# Patient Record
Sex: MALE | Race: WHITE | NOT HISPANIC OR LATINO
[De-identification: names, ages, dates, MRNs, and addresses within clinical notes are randomized per-mention and may not be internally consistent; named-entity substitution may affect disease eponyms.]

---

## 2017-02-05 ENCOUNTER — TRANSCRIPTION ENCOUNTER (OUTPATIENT)
Age: 62
End: 2017-02-05

## 2020-08-13 ENCOUNTER — APPOINTMENT (OUTPATIENT)
Dept: UROLOGY | Facility: CLINIC | Age: 65
End: 2020-08-13
Payer: COMMERCIAL

## 2020-08-13 VITALS
RESPIRATION RATE: 16 BRPM | HEART RATE: 66 BPM | BODY MASS INDEX: 29.49 KG/M2 | HEIGHT: 70 IN | WEIGHT: 206 LBS | SYSTOLIC BLOOD PRESSURE: 135 MMHG | DIASTOLIC BLOOD PRESSURE: 80 MMHG

## 2020-08-13 VITALS — TEMPERATURE: 97.1 F

## 2020-08-13 DIAGNOSIS — Z80.42 FAMILY HISTORY OF MALIGNANT NEOPLASM OF PROSTATE: ICD-10-CM

## 2020-08-13 PROBLEM — Z00.00 ENCOUNTER FOR PREVENTIVE HEALTH EXAMINATION: Status: ACTIVE | Noted: 2020-08-13

## 2020-08-13 PROCEDURE — 99204 OFFICE O/P NEW MOD 45 MIN: CPT

## 2020-08-25 ENCOUNTER — RESULT REVIEW (OUTPATIENT)
Age: 65
End: 2020-08-25

## 2020-08-25 ENCOUNTER — APPOINTMENT (OUTPATIENT)
Dept: CT IMAGING | Facility: IMAGING CENTER | Age: 65
End: 2020-08-25
Payer: COMMERCIAL

## 2020-08-25 ENCOUNTER — OUTPATIENT (OUTPATIENT)
Dept: OUTPATIENT SERVICES | Facility: HOSPITAL | Age: 65
LOS: 1 days | End: 2020-08-25
Payer: COMMERCIAL

## 2020-08-25 ENCOUNTER — APPOINTMENT (OUTPATIENT)
Dept: UROLOGY | Facility: CLINIC | Age: 65
End: 2020-08-25
Payer: COMMERCIAL

## 2020-08-25 VITALS — TEMPERATURE: 97.3 F

## 2020-08-25 DIAGNOSIS — R35.0 FREQUENCY OF MICTURITION: ICD-10-CM

## 2020-08-25 DIAGNOSIS — Z00.8 ENCOUNTER FOR OTHER GENERAL EXAMINATION: ICD-10-CM

## 2020-08-25 DIAGNOSIS — I10 ESSENTIAL (PRIMARY) HYPERTENSION: ICD-10-CM

## 2020-08-25 DIAGNOSIS — R31.29 OTHER MICROSCOPIC HEMATURIA: ICD-10-CM

## 2020-08-25 LAB
ANION GAP SERPL CALC-SCNC: 11 MMOL/L
APPEARANCE: CLEAR
BACTERIA UR CULT: NORMAL
BACTERIA: NEGATIVE
BILIRUBIN URINE: NEGATIVE
BLOOD URINE: ABNORMAL
BUN SERPL-MCNC: 12 MG/DL
CALCIUM SERPL-MCNC: 9.6 MG/DL
CHLORIDE SERPL-SCNC: 102 MMOL/L
CO2 SERPL-SCNC: 28 MMOL/L
COLOR: YELLOW
CREAT SERPL-MCNC: 1 MG/DL
GLUCOSE QUALITATIVE U: NEGATIVE
GLUCOSE SERPL-MCNC: 111 MG/DL
HYALINE CASTS: 0 /LPF
KETONES URINE: NEGATIVE
LEUKOCYTE ESTERASE URINE: NEGATIVE
MICROSCOPIC-UA: NORMAL
NITRITE URINE: NEGATIVE
PH URINE: 6
POTASSIUM SERPL-SCNC: 4.6 MMOL/L
PROTEIN URINE: NORMAL
PSA SERPL-MCNC: 0.75 NG/ML
RED BLOOD CELLS URINE: 5 /HPF
SODIUM SERPL-SCNC: 141 MMOL/L
SPECIFIC GRAVITY URINE: 1.02
SQUAMOUS EPITHELIAL CELLS: 0 /HPF
URINE CYTOLOGY: NORMAL
UROBILINOGEN URINE: NORMAL
WHITE BLOOD CELLS URINE: 1 /HPF

## 2020-08-25 PROCEDURE — 52000 CYSTOURETHROSCOPY: CPT

## 2020-08-25 PROCEDURE — 74178 CT ABD&PLV WO CNTR FLWD CNTR: CPT | Mod: 26

## 2020-08-25 PROCEDURE — 99214 OFFICE O/P EST MOD 30 MIN: CPT | Mod: 25

## 2020-08-25 PROCEDURE — 74178 CT ABD&PLV WO CNTR FLWD CNTR: CPT

## 2020-08-25 NOTE — REVIEW OF SYSTEMS
[Negative] : Heme/Lymph [Told you have blood in urine on a urine test] : told blood was present in a urine test

## 2020-09-06 NOTE — ASSESSMENT
[FreeTextEntry1] : 8/13/2020: Mr. Mujica is a 65 year old male presenting with microscopic hematuria. Wakes up 1-2x times during the night to urinate. States he drinks 2-3 cups of coffee and 3 cups of water during the day. Patient denies dysuria, gross hematuria, urgency, or incontinence. Stopped smoking 22 years ago. Has family history of uncle with prostate CA. \par \par 8/25/2020: Patient presents today for cystoscopy. Last CT scan of abdomen and pelvis was negative. Pt is former smoker, quit 20 years ago. No h/o kidney stones. Last lab work on 7/16/2020 showed Creatinine 1.02., ALP 82, 10-20 RBCs/HPF, PSA 0.7. +FHx of uncle with prostate CA, diagnosed  at 81 y/o. \par \par Knee chest position was used for the rectal exam. Digital rectal exam found no suspicious rectal masses. No gross blood on examining fingers. No rectal mucosal lesions. Anal tone is slightly tight. The prostate is non tender, with normal texture, discrete borders, and no nodules. It is a 40 gram transurethral resection size. \par \par Cystoscopy findings: No urethral meatal stenosis. No pendulous urethral stricture. Minimal impression of prostate into bladder. Bladder modestly inflamed with 2+ trabeculation. Slight bleeding from bladder neck. Left ureteral orifice is a slit. Right ureteral orifice is a slit. No stones, urothelial neoplasms, or tumors. Prostate loses a little bit of blood. Minimal lateral hypertrophy. No urethral strictures, stones, or cancers. \par \par Two tablets of Bactrim are provided. She is to take one tablet now and one tablet tonight to limit her risk of infection. \par \par Patient will RTO in 6 months or sooner if urination worsens.

## 2020-09-06 NOTE — HISTORY OF PRESENT ILLNESS
[FreeTextEntry1] : 8/13/2020: Mr. Mujica is a 65 year old male presenting with microscopic hematuria. Wakes up 1-2x times during the night to urinate. States he drinks 2-3 cups of coffee and 3 cups of water during the day. Patient denies dysuria, gross hematuria, urgency, or incontinence. Stopped smoking 22 years ago. Has family history of uncle with prostate CA. \par \par 8/25/2020: Patient presents today for cystoscopy. Last CT scan of abdomen and pelvis was negative. Pt is former smoker, quit 20 years ago. No h/o kidney stones. Last lab work on 7/16/2020 showed Creatinine 1.02., ALP 82, 10-20 RBCs/HPF, PSA 0.7. +FHx of uncle with prostate CA, diagnosed  at 81 y/o.

## 2020-09-06 NOTE — PHYSICAL EXAM
[General Appearance - Well Developed] : well developed [Normal Appearance] : normal appearance [General Appearance - In No Acute Distress] : no acute distress [Skin Color & Pigmentation] : normal skin color and pigmentation [] : no respiratory distress [Oriented To Time, Place, And Person] : oriented to person, place, and time [Affect] : the affect was normal [Mood] : the mood was normal [No Focal Deficits] : no focal deficits [Prostate Size ___ gm] : prostate size [unfilled] gm [Normal Station and Gait] : the gait and station were normal for the patient's age [Abdomen Soft] : soft [Abdomen Tenderness] : non-tender [FreeTextEntry1] : Knee chest position was used for the rectal exam. Digital rectal exam found no suspicious rectal masses. No gross blood on examining fingers. No rectal mucosal lesions. Anal tone is slightly tight. The prostate is non tender, with normal texture, discrete borders, and no nodules. It is a 40 gram transurethral resection size.

## 2020-09-06 NOTE — ADDENDUM
[FreeTextEntry1] : I, Kim Fatimain, acted solely as a scribe for Dr. Rhys Geronimo on this date 08/25/2020.\par \par All medical record entries made by the Scribe were at my, Dr. Rhys Geronimo, direction and personally dictated by me on 08/25/2020. I have reviewed the chart and agree that the record accurately reflects my personal performance of the history, physical exam, assessment and plan.  I have also personally directed, reviewed and agreed with the chart.

## 2020-09-07 PROBLEM — Z80.42 FAMILY HISTORY OF MALIGNANT NEOPLASM OF PROSTATE: Status: ACTIVE | Noted: 2020-09-07

## 2020-09-07 NOTE — ASSESSMENT
[FreeTextEntry1] : 8/13/2020: Mr. Mujica is a 65 year old male presenting with microscopic hematuria. Wakes up 1-2x times during the night to urinate. States he drinks 2-3 cups of coffee and 3 cups of water during the day. Patient denies dysuria, gross hematuria, urgency, or incontinence. Stopped smoking 22 years ago. Has family history of uncle with prostate CA. \par \par The patient produced a urine sample which will be sent for urinalysis, urine cytology, and urine culture.\par \par Blood work today included comprehensive metabolic panel, CBC, PSA, and testosterone. \par \par The patient will undergo a CT of the abdomen and pelvis. \par \par The patient will undergo a cystoscopy. I advised the patient to eat on the day of the procedure and can drive themselves if they like. I informed the patient we will insert lidocaine for local anesthesia. I discussed the risk of infection but informed the patient that we will provide an antibiotic after the procedure and at bedtime. \par \par The patient will return in 2-3 weeks for a follow up.

## 2020-09-07 NOTE — PHYSICAL EXAM
[General Appearance - Well Nourished] : well nourished [General Appearance - Well Developed] : well developed [Normal Appearance] : normal appearance [Well Groomed] : well groomed [Heart Rate And Rhythm] : Heart rate and rhythm were normal [General Appearance - In No Acute Distress] : no acute distress [Edema] : no peripheral edema [Respiration, Rhythm And Depth] : normal respiratory rhythm and effort [Exaggerated Use Of Accessory Muscles For Inspiration] : no accessory muscle use [Abdomen Tenderness] : non-tender [Abdomen Soft] : soft [Urethral Meatus] : meatus normal [Costovertebral Angle Tenderness] : no ~M costovertebral angle tenderness [Penis Abnormality] : normal circumcised penis [Scrotum] : the scrotum was normal [Urinary Bladder Findings] : the bladder was normal on palpation [Testes Mass (___cm)] : there were no testicular masses [No Prostate Nodules] : no prostate nodules [Normal Station and Gait] : the gait and station were normal for the patient's age [No Focal Deficits] : no focal deficits [] : no rash [Affect] : the affect was normal [Mood] : the mood was normal [Oriented To Time, Place, And Person] : oriented to person, place, and time [No Palpable Adenopathy] : no palpable adenopathy [Not Anxious] : not anxious [Skin Color & Pigmentation] : normal skin color and pigmentation [Cervical Lymph Nodes Enlarged Anterior Bilaterally] : anterior cervical [Cervical Lymph Nodes Enlarged Posterior Bilaterally] : posterior cervical [Inguinal Lymph Nodes Enlarged Bilaterally] : inguinal [FreeTextEntry1] : no submandibular gland tenderness

## 2020-09-07 NOTE — LETTER HEADER
[FreeTextEntry3] : Rhys Geronimo MD, PhD\par [facility]\par [address]\par [address] [place] [address]\[place] [address]

## 2020-09-07 NOTE — HISTORY OF PRESENT ILLNESS
[FreeTextEntry1] : Mr. Mujica is a 65 year old male presenting with microscopic hematuria. Wakes up 1-2x times during the night to urinate. States he drinks 2-3 cups of coffee and 3 cups of water during the day. Patient denies dysuria, gross hematuria, urgency, or incontinence. Stopped smoking 22 years ago. Has family history of uncle with prostate CA.

## 2020-09-07 NOTE — REVIEW OF SYSTEMS
[Itching] : itching [Negative] : Heme/Lymph [Nocturia] : nocturia [Feeling Poorly] : not feeling poorly [Chest Pain] : no chest pain [Cough] : no cough [Constipation] : no constipation [Dysuria] : no dysuria [Incontinence] : no incontinence

## 2020-09-07 NOTE — ADDENDUM
[FreeTextEntry1] : I, Kim Fatimain, acted solely as a scribe for Dr. Rhys Geronimo on this date 08/13/2020.\par \par All medical record entries made by the Scribe were at my, Dr. Rhys Geronimo, direction and personally dictated by me on 08/13/2020. I have reviewed the chart and agree that the record accurately reflects my personal performance of the history, physical exam, assessment and plan.  I have also personally directed, reviewed and agreed with the chart.

## 2020-09-07 NOTE — LETTER BODY
[Dear  ___] : Dear  [unfilled], [Consult Letter:] : I had the pleasure of evaluating your patient, [unfilled]. [FreeTextEntry2] : Real Linda MD \par [address] [place] [id number] [FreeTextEntry1] : 8/13/2020: Mr. Mujica is a 65 year old male presenting with microscopic hematuria. Wakes up 1-2x times during the night to urinate. States he drinks 2-3 cups of coffee and 3 cups of water during the day. Patient denies dysuria, gross hematuria, urgency, or incontinence. Stopped smoking 22 years ago. Has family history of uncle with prostate CA. \par \par The patient produced a urine sample which will be sent for urinalysis, urine cytology, and urine culture.\par \par Blood work today included comprehensive metabolic panel, CBC, PSA, and testosterone. \par \par The patient will undergo a CT of the abdomen and pelvis. \par \par The patient will undergo a cystoscopy. I advised the patient to eat on the day of the procedure and can drive themselves if they like. I informed the patient we will insert lidocaine for local anesthesia. I discussed the risk of infection but informed the patient that we will provide an antibiotic after the procedure and at bedtime. \par \par The patient will return in 2-3 weeks for a follow up.

## 2020-09-12 DIAGNOSIS — I10 ESSENTIAL (PRIMARY) HYPERTENSION: ICD-10-CM

## 2020-09-12 DIAGNOSIS — R31.29 OTHER MICROSCOPIC HEMATURIA: ICD-10-CM

## 2021-04-06 ENCOUNTER — APPOINTMENT (OUTPATIENT)
Dept: UROLOGY | Facility: CLINIC | Age: 66
End: 2021-04-06
Payer: MEDICARE

## 2021-04-06 VITALS — HEART RATE: 65 BPM | SYSTOLIC BLOOD PRESSURE: 144 MMHG | DIASTOLIC BLOOD PRESSURE: 84 MMHG | TEMPERATURE: 97.8 F

## 2021-04-06 DIAGNOSIS — R21 RASH AND OTHER NONSPECIFIC SKIN ERUPTION: ICD-10-CM

## 2021-04-06 DIAGNOSIS — Z12.5 ENCOUNTER FOR SCREENING FOR MALIGNANT NEOPLASM OF PROSTATE: ICD-10-CM

## 2021-04-06 DIAGNOSIS — R31.29 OTHER MICROSCOPIC HEMATURIA: ICD-10-CM

## 2021-04-06 PROCEDURE — 99214 OFFICE O/P EST MOD 30 MIN: CPT

## 2021-04-06 PROCEDURE — 88112 CYTOPATH CELL ENHANCE TECH: CPT | Mod: 26

## 2021-04-06 PROCEDURE — 99072 ADDL SUPL MATRL&STAF TM PHE: CPT

## 2021-04-06 NOTE — ADDENDUM
[FreeTextEntry1] : This note was authored by Sara Kidd working as a scribe for Dr.Gary Geronimo. I, Dr. Rhys Geronimo have reviewed the content of this note and confirm it is true and accurate. I personally performed the history and physical examination and made all the decisions 04/06/2021.

## 2021-04-06 NOTE — ASSESSMENT
[FreeTextEntry1] : 8/13/2020: Mr. Mujica is a 65 year old male presenting with microscopic hematuria. Wakes up 1-2x times during the night to urinate. States he drinks 2-3 cups of coffee and 3 cups of water during the day. Patient denies dysuria, gross hematuria, urgency, or incontinence. Stopped smoking 22 years ago. Has family history of uncle with prostate CA. \par \par 8/25/2020: Patient presents today for cystoscopy. Last CT scan of abdomen and pelvis was negative. Pt is former smoker, quit 20 years ago. No h/o kidney stones. Last lab work on 7/16/2020 showed Creatinine 1.02., ALP 82, 10-20 RBCs/HPF, PSA 0.7. +FHx of uncle with prostate CA, diagnosed  at 79 y/o. \par \par Knee chest position was used for the rectal exam. Digital rectal exam found no suspicious rectal masses. No gross blood on examining fingers. No rectal mucosal lesions. Anal tone is slightly tight. The prostate is non tender, with normal texture, discrete borders, and no nodules. It is a 40 gram transurethral resection size. \par \par Cystoscopy findings: No urethral meatal stenosis. No pendulous urethral stricture. Minimal impression of prostate into bladder. Bladder modestly inflamed with 2+ trabeculation. Slight bleeding from bladder neck. Left ureteral orifice is a slit. Right ureteral orifice is a slit. No stones, urothelial neoplasms, or tumors. Prostate loses a little bit of blood. Minimal lateral hypertrophy. No urethral strictures, stones, or cancers. \par \par Two tablets of Bactrim are provided. She is to take one tablet now and one tablet tonight to limit her risk of infection. \par Patient will RTO in 6 months or sooner if urination worsens.\par \par 04/06/2021: Patient presents today for a follow up visit. Patient has received both doses of the covid vaccine. He reports mild side effects from the second dose. Patient has not had any recent urinalysis. He denies ever seeing blood in his urine. Denies personal or FHx of kidney stones. Pt's maternal uncle had prostate CA that was detected in mid-late 70's. PSA in August was 0.75. Reports nocturia 1x a night. Denies urinary urgency, pushing, straining, burning, or chest pains. Occasional constipation. Patient is currently retired from being a manager at [employer]. Pt reports that occasionally he gets round, red rashes on his inner thigh area that takes a while to clear up. He does  not currently have it, but was concerned about it since he saw a dermatologist and was not successful with them. \par \par The patient produced a urine sample which will be sent for urinalysis, urine cytology, and urine culture. \par \par I advised him that if his rash comes back and it appears in the groin area, he can follow up with me and I will be happy to treat him for it. If it appears that its more on the thigh and not the groin, I advised him to follow up with another dermatologist or his PCP. \par \par Patient will RTO in 4 months for a follow up visit or sooner if clinically indicated. \par \par Preparation, in person office visit, and coordination of care: 30   minutes.

## 2021-04-06 NOTE — REVIEW OF SYSTEMS
[Constipation] : constipation [Nocturia] : nocturia [Feeling Poorly] : not feeling poorly [Chest Pain] : no chest pain [Burning Sensation] : no burning sensation during urination [Initiating Urination Req. Strain] : initiating urination does not require straining [Erectile Dysfunction] : no erectile dysfunction

## 2021-04-06 NOTE — HISTORY OF PRESENT ILLNESS
[FreeTextEntry1] : 8/13/2020: Mr. Mujica is a 65 year old male presenting with microscopic hematuria. Wakes up 1-2x times during the night to urinate. States he drinks 2-3 cups of coffee and 3 cups of water during the day. Patient denies dysuria, gross hematuria, urgency, or incontinence. Stopped smoking 22 years ago. Has family history of uncle with prostate CA. \par \par 8/25/2020: Patient presents today for cystoscopy. Last CT scan of abdomen and pelvis was negative. Pt is former smoker, quit 20 years ago. No h/o kidney stones. Last lab work on 7/16/2020 showed Creatinine 1.02., ALP 82, 10-20 RBCs/HPF, PSA 0.7. +FHx of uncle with prostate CA, diagnosed  at 81 y/o. \par \par 04/06/2021: Patient presents today for a follow up visit. Patient has received both doses of the covid vaccine. He reports mild side effects from the second dose. Patient has not had any recent urinalysis. He denies ever seeing blood in his urine. Denies personal or FHx of kidney stones. Pt's maternal uncle had prostate CA that was detected in mid-late 70's. PSA in August was 0.75. Reports nocturia 1x a night. Denies urinary urgency, pushing, straining, burning, or chest pains. Occasional constipation. Patient is currently retired from being a manager at [employer]. Pt reports that occasionally he gets round, red rashes on his inner thigh area that takes a while to clear up. He does  not currently have it, but was concerned about it since he saw a dermatologist and was not successful with them. \par

## 2021-04-18 LAB
APPEARANCE: CLEAR
BACTERIA UR CULT: NORMAL
BACTERIA: NEGATIVE
BILIRUBIN URINE: NEGATIVE
BLOOD URINE: NORMAL
COLOR: YELLOW
GLUCOSE QUALITATIVE U: NEGATIVE
HYALINE CASTS: 0 /LPF
KETONES URINE: NEGATIVE
LEUKOCYTE ESTERASE URINE: NEGATIVE
MICROSCOPIC-UA: NORMAL
NITRITE URINE: NEGATIVE
PH URINE: 6
PROTEIN URINE: ABNORMAL
RED BLOOD CELLS URINE: 3 /HPF
SPECIFIC GRAVITY URINE: 1.03
SQUAMOUS EPITHELIAL CELLS: 0 /HPF
URINE COMMENTS: NORMAL
URINE CYTOLOGY: NORMAL
UROBILINOGEN URINE: NORMAL
WHITE BLOOD CELLS URINE: 1 /HPF

## 2021-08-03 ENCOUNTER — APPOINTMENT (OUTPATIENT)
Dept: UROLOGY | Facility: CLINIC | Age: 66
End: 2021-08-03

## 2021-09-24 ENCOUNTER — OUTPATIENT (OUTPATIENT)
Dept: OUTPATIENT SERVICES | Facility: HOSPITAL | Age: 66
LOS: 1 days | Discharge: HOME | End: 2021-09-24

## 2021-10-06 PROBLEM — I10 ESSENTIAL HYPERTENSION: Status: ACTIVE | Noted: 2020-09-06

## 2021-11-08 ENCOUNTER — TRANSCRIPTION ENCOUNTER (OUTPATIENT)
Age: 66
End: 2021-11-08

## 2022-10-26 ENCOUNTER — RESULT REVIEW (OUTPATIENT)
Age: 67
End: 2022-10-26

## 2023-01-17 NOTE — REASON FOR VISIT
Pt is A & O x 3, denies distress @ this time, states pain is still present. Pt denies SOB, respirations are even and unlabored. Skin remains warm, dry and pink. VS. Will continue to monitor closely.    [Follow-up Visit ___] : a follow-up visit  for [unfilled]

## 2024-08-19 ENCOUNTER — NON-APPOINTMENT (OUTPATIENT)
Age: 69
End: 2024-08-19

## 2024-08-21 ENCOUNTER — APPOINTMENT (OUTPATIENT)
Dept: ORTHOPEDIC SURGERY | Facility: CLINIC | Age: 69
End: 2024-08-21

## 2024-08-21 VITALS — HEIGHT: 70 IN | BODY MASS INDEX: 29.49 KG/M2 | WEIGHT: 206 LBS

## 2024-08-21 DIAGNOSIS — M79.672 PAIN IN LEFT FOOT: ICD-10-CM

## 2024-08-21 DIAGNOSIS — M25.511 PAIN IN RIGHT SHOULDER: ICD-10-CM

## 2024-08-21 PROCEDURE — 99203 OFFICE O/P NEW LOW 30 MIN: CPT | Mod: 25

## 2024-08-21 PROCEDURE — 73030 X-RAY EXAM OF SHOULDER: CPT | Mod: RT

## 2024-08-21 PROCEDURE — 20610 DRAIN/INJ JOINT/BURSA W/O US: CPT | Mod: RT

## 2025-08-05 ENCOUNTER — NON-APPOINTMENT (OUTPATIENT)
Age: 70
End: 2025-08-05

## 2025-08-05 ENCOUNTER — APPOINTMENT (OUTPATIENT)
Age: 70
End: 2025-08-05
Payer: MEDICARE

## 2025-08-05 PROCEDURE — 92014 COMPRE OPH EXAM EST PT 1/>: CPT

## 2025-08-05 PROCEDURE — 92134 CPTRZ OPH DX IMG PST SGM RTA: CPT

## 2025-08-05 PROCEDURE — 92202 OPSCPY EXTND ON/MAC DRAW: CPT
